# Patient Record
Sex: FEMALE | Race: WHITE
[De-identification: names, ages, dates, MRNs, and addresses within clinical notes are randomized per-mention and may not be internally consistent; named-entity substitution may affect disease eponyms.]

---

## 2018-05-21 ENCOUNTER — HOSPITAL ENCOUNTER (OUTPATIENT)
Dept: HOSPITAL 95 - LAB SHORT | Age: 63
Discharge: HOME | End: 2018-05-21
Attending: INTERNAL MEDICINE
Payer: COMMERCIAL

## 2018-05-21 DIAGNOSIS — Z01.419: Primary | ICD-10-CM

## 2018-05-21 PROCEDURE — G0145 SCR C/V CYTO,THINLAYER,RESCR: HCPCS

## 2018-05-24 LAB — .: (no result)

## 2019-12-23 NOTE — NUR
12/23/19 1401 Catherine Deshpande
PT. C/O HER IV HURTING BEFORE GOING INTO ENDO ROOM. NO INFILTRATION
OBSERVED, IV RAN FINE. PT. HAD VERBALIZED THAT THE FIRST IV WAS
MISSED. ORSC.NSC LOOSENED IV TEGADERM & PT. HAD VERBALIZED MAKING THE
IV FEEL A LITTLE BETTER. WHEN IV WAS DISCONTINUED, OBSERVED FIRST IV
SITE WITH APPROX. QUARTER SIZE BRUISING. PT. HAD C/O IV HURTING. PT.
INSTRUCTED THAT IF BOTHERSOME SHE COULD PLACE A WARM PACK ON THE SITE
AT HOME.